# Patient Record
Sex: FEMALE | Race: WHITE | NOT HISPANIC OR LATINO | URBAN - METROPOLITAN AREA
[De-identification: names, ages, dates, MRNs, and addresses within clinical notes are randomized per-mention and may not be internally consistent; named-entity substitution may affect disease eponyms.]

---

## 2019-04-09 ENCOUNTER — EMERGENCY (EMERGENCY)
Facility: HOSPITAL | Age: 59
LOS: 1 days | Discharge: HOME | End: 2019-04-09
Attending: EMERGENCY MEDICINE
Payer: MEDICAID

## 2019-04-09 VITALS
OXYGEN SATURATION: 99 % | TEMPERATURE: 98 F | WEIGHT: 169.98 LBS | HEART RATE: 96 BPM | SYSTOLIC BLOOD PRESSURE: 138 MMHG | RESPIRATION RATE: 16 BRPM | HEIGHT: 64 IN | DIASTOLIC BLOOD PRESSURE: 86 MMHG

## 2019-04-09 PROCEDURE — 99283 EMERGENCY DEPT VISIT LOW MDM: CPT

## 2019-04-09 RX ORDER — AMOXICILLIN 250 MG/5ML
500 SUSPENSION, RECONSTITUTED, ORAL (ML) ORAL ONCE
Qty: 0 | Refills: 0 | Status: COMPLETED | OUTPATIENT
Start: 2019-04-09 | End: 2019-04-09

## 2019-04-09 RX ORDER — AMOXICILLIN 250 MG/5ML
1 SUSPENSION, RECONSTITUTED, ORAL (ML) ORAL
Qty: 30 | Refills: 0 | OUTPATIENT
Start: 2019-04-09 | End: 2019-04-18

## 2019-04-09 RX ADMIN — Medication 500 MILLIGRAM(S): at 15:26

## 2019-04-09 NOTE — ED ADULT NURSE NOTE - NSIMPLEMENTINTERV_GEN_ALL_ED
Implemented All Universal Safety Interventions:  [place] to call system. Call bell, personal items and telephone within reach. Instruct patient to call for assistance. Room bathroom lighting operational. Non-slip footwear when patient is off stretcher. Physically safe environment: no spills, clutter or unnecessary equipment. Stretcher in lowest position, wheels locked, appropriate side rails in place.

## 2019-04-09 NOTE — ED PROVIDER NOTE - OBJECTIVE STATEMENT
58 y/o F with a PMHx of HTN and no PSHx presents to ED c/o right sided facial redness/pain x this morning. Also reports 2 days of painful bumps to back of head and right lateral side of neck. Redness to temple periocular area. Denies fever/chills/night sweats/eye pain/ear pain. Reports no sudden progression of redness. NKDA. 58 y/o F with a PMHx of HTN and no PSHx presents to ED c/o right sided facial redness/pain x this morning. Also reports 2 days of painful bumps to back of head and right lateral side of neck. Redness to temple periocular area. Denies fever/chills/night sweats/eye pain/ear pain/vision changes or any other complaints. Reports no sudden progression of redness. NKDA.

## 2019-04-09 NOTE — ED PROVIDER NOTE - CLINICAL SUMMARY MEDICAL DECISION MAKING FREE TEXT BOX
60 y/o F presenting with painful rash x this morning. Hx and findings suggestive of erysipelas cellulitis and reactive lymphadenopathy. No red flags for admission. Amoxicillin DID x 10 days. Close follow-up with PMD in 48 hours.

## 2019-04-09 NOTE — ED PROVIDER NOTE - PROGRESS NOTE DETAILS
No red flags. Well-appearing. Will do PO Amox and PMD follow up in 2 days. Pt is well appearing walking with steady gait, stable for discharge and follow up without fail with medical doctor. I had a detailed discussion with the patient and/or guardian regarding the historical points, exam findings, and any diagnostic results supporting the discharge diagnosis. Pt educated on care and need for follow up. Strict return instructions and red flag signs and symptoms discussed with patient. Questions answered. Pt shows understanding of discharge information and agrees to follow.

## 2019-04-09 NOTE — ED PROVIDER NOTE - HEME LYMPH, MLM
Mild right-sided occipital right submandibular, right anterior lymphoadenopathy. Without vessel hardness along the vessels. Mild right-sided occipital, right submandibular, right anterior lymphoadenopathy. Without vessel hardness along the vessels.

## 2019-04-09 NOTE — ED PROVIDER NOTE - SKIN, MLM
well demarcated blanching redness to the right periocular area. well demarcated blanching redness to the right pre-auricular area.

## 2019-04-09 NOTE — ED PROVIDER NOTE - CCCP TRG CHIEF CMPLNT
Spoke with Pt's daughter, will call to schedule follow up appointment after father is discharged.  
painful reddened leision on r temporal area

## 2019-04-09 NOTE — ED ADULT NURSE NOTE - OBJECTIVE STATEMENT
c/o redness to right temple and right side of neck c/o redness to right temple and right side of neck and lumps to back of head right side , below right jaw and right side of neck since this morning.With redness to right temporal area , right side of neck and with mild palapable  lump to right occipital area and right side of neck and submadibular area.

## 2019-04-09 NOTE — ED PROVIDER NOTE - ATTENDING CONTRIBUTION TO CARE
pt comes in right sided facial redness ns swelling    red rash with raised border   + lymphadenopathy    abx

## 2021-09-09 NOTE — ED ADULT NURSE NOTE - CHIEF COMPLAINT QUOTE
Painful reddened lesion on r temporal area  since am Wt Readings from Last 3 Encounters:   09/08/21 103 kg (227 lb 1 2 oz)       · CXR BL lower lobe infiltrates and BNP 3286 suggestive of volume overload  Continue Lasix 40 IV b i d    Cardiology consult  · I/O's / daily weights    -5 1 L since admission  Pending cardiology consult

## 2024-11-13 NOTE — ED PROVIDER NOTE - CROS ED EYES ALL NEG
Jess Wing  Colon/Rectal Surgery  [address] [facility], [facility] [place] [address]  Phone: [phone]  Fax: [phone]  Follow Up Time: 2 weeks   - - -